# Patient Record
Sex: MALE | Race: WHITE | NOT HISPANIC OR LATINO | Employment: FULL TIME | URBAN - METROPOLITAN AREA
[De-identification: names, ages, dates, MRNs, and addresses within clinical notes are randomized per-mention and may not be internally consistent; named-entity substitution may affect disease eponyms.]

---

## 2023-05-03 LAB
LEAD, INDUSTRIAL, WHOLE BLOOD: 17.1 UG/DL
ZINC, PROTOPORPHYRIN  BLOOD: 27 UG/DL (ref 0–40)
ZINC, PROTOPORPHYRIN (ZPP) RATIO: 46 UMOLZPP/MOLHEM (ref 0–69)

## 2024-04-01 ENCOUNTER — OFFICE VISIT (OUTPATIENT)
Dept: PRIMARY CARE | Facility: CLINIC | Age: 39
End: 2024-04-01
Payer: COMMERCIAL

## 2024-04-01 VITALS
OXYGEN SATURATION: 98 % | SYSTOLIC BLOOD PRESSURE: 126 MMHG | BODY MASS INDEX: 23.37 KG/M2 | WEIGHT: 154.2 LBS | DIASTOLIC BLOOD PRESSURE: 78 MMHG | RESPIRATION RATE: 18 BRPM | TEMPERATURE: 97 F | HEART RATE: 88 BPM | HEIGHT: 68 IN

## 2024-04-01 DIAGNOSIS — K05.10 GINGIVITIS: ICD-10-CM

## 2024-04-01 DIAGNOSIS — Z00.00 HEALTHCARE MAINTENANCE: Primary | ICD-10-CM

## 2024-04-01 PROCEDURE — 99386 PREV VISIT NEW AGE 40-64: CPT | Performed by: FAMILY MEDICINE

## 2024-04-01 PROCEDURE — 90471 IMMUNIZATION ADMIN: CPT | Performed by: FAMILY MEDICINE

## 2024-04-01 PROCEDURE — 90715 TDAP VACCINE 7 YRS/> IM: CPT | Performed by: FAMILY MEDICINE

## 2024-04-01 RX ORDER — CHLORHEXIDINE GLUCONATE ORAL RINSE 1.2 MG/ML
15 SOLUTION DENTAL AS NEEDED
Qty: 120 ML | Refills: 0 | Status: SHIPPED | OUTPATIENT
Start: 2024-04-01 | End: 2024-04-15

## 2024-04-01 ASSESSMENT — ENCOUNTER SYMPTOMS
NECK STIFFNESS: 0
ANAL BLEEDING: 0
RECTAL PAIN: 0
WHEEZING: 0
ABDOMINAL PAIN: 0
CHEST TIGHTNESS: 0
EYE REDNESS: 0
DIFFICULTY URINATING: 0
HEADACHES: 0
NECK PAIN: 1
JOINT SWELLING: 0
PALPITATIONS: 0
HEMATURIA: 0
COUGH: 0
POLYPHAGIA: 0
DIARRHEA: 0
POLYDIPSIA: 0
TREMORS: 0
APNEA: 0
NAUSEA: 0
DIAPHORESIS: 0
SHORTNESS OF BREATH: 0
CHOKING: 0
BLOOD IN STOOL: 0
DIZZINESS: 0
CONSTIPATION: 0
DYSURIA: 0
CHILLS: 0
ARTHRALGIAS: 0
EYE ITCHING: 0
TROUBLE SWALLOWING: 0
EYE DISCHARGE: 0
BACK PAIN: 1
SINUS PRESSURE: 0
SORE THROAT: 0
ABDOMINAL DISTENTION: 0
SINUS PAIN: 0
NUMBNESS: 0
FLANK PAIN: 0
SEIZURES: 0
VOMITING: 0
UNEXPECTED WEIGHT CHANGE: 0
FATIGUE: 0
PHOTOPHOBIA: 0

## 2024-04-01 NOTE — ASSESSMENT & PLAN NOTE
Blood  pressure  is   stable Monitor your blood pressure at home and notify if blood pressure consistently over 140 systolic 90 diastolic   See dentist twice yearly  Get checked yearly  30 minutes of moderate intensity exercise 5 days weekly  Application of sunscreen every 2 hours during peak times  Regular sleep schedule  At least 8 hours nightly of sleep..  Limit TV/screen time 30 minutes prior to bedtime     CONSIDER   TAPER OFF WEED

## 2024-04-01 NOTE — PATIENT INSTRUCTIONS
Please consider the following medications to help    mitigate  Covid during this time  Vitamin C 1000 mg     daily  B complex daily  ZINC   30 - DAILY     VITAMIN D 3   200O IU DAILY  MENS   ENERGY  AND  METABOLISM   VITAMIN B12 2500 I U  SL          Multivitamin with zinc  Extra rest  Stress reduction  IN  SYMPTOMATIC  PATIENTS, MONITORING WITH  HOME PULSE OXIMETRY  IS RECOMMENDED..  AMBULATORY  DESATURATIONS BELOW  94%  SHOULD PROMPT HOSPITAL  ADMISSSION       Please consider exercise program involving walking or some other form of aerobic activity 5 days weekly for 30 minutes... Let's also consider strengthening of large muscle groups like the abdominal muscles or shoulder muscles... Twice weekly with reps of 5/10/15 exercises and gradually increase strength.. This is not heavy strength training but light weight training... Sit ups or back exercise routine.. Please ask for handout if uncertain how to do..This  will help to strengthen your muscles which in turn will help you to lose weight.... You might ask what is the best diet available.. I would strongly encourage you to consider  Weight Watchers.. And as  your  fellow on  Weight Watchers physician attempting to  live this  LIFE  style  choice with you....  I will be glad to give you recommendations on what to eat.. Consider buying Ailyn bread.., megan bagle thin bread.. oikos yogurt... eggs  to eat as hard-boiled... Halo top ice cream for snack... All these are delicious foods which.. when eaten and  being compliant eating three  meals daily  breakfast lunch and dinner, drinking  64 ounces of water daily we will all win together !!!!!!!. This will be a means for you to lose weight... Consider also the smart phone sheela ... My plate.. Or My  fitness  pal..,  as additional possibilities for weight loss... Toni Ponce!    Discussed medication side effects.  The  risk benefits and treatment options  discussed with patient.       Please schedule  follow-up appointment based upon your improvement/failure to improve/chronic medical conditions and physician recommendations during office appointment at the .       Patient advised to go to er if symptoms worsen or to call answering service, or to return to office for additional evaluation    This note was partially  generated using Dragon voice recognition and there may be incorrect words, wording, spelling, or pronunciation errors that were not corrected prior to committing the note to the medical record.

## 2024-04-01 NOTE — PROGRESS NOTES
"Subjective   Moise Meyer \"Deangelo\" is a 38 y.o. male who is here for a routine exam.  Active Problem List      Comprehensive Medical/Surgical/Social/Family History  Past Medical History:   Diagnosis Date    Broken ankle      Past Surgical History:   Procedure Laterality Date    VASECTOMY       Social History     Social History Narrative    Not on file         Allergies and Medications  Patient has no known allergies.  No current outpatient medications on file prior to visit.     No current facility-administered medications on file prior to visit.       Lifestyle  Diet: some fast food  ...    A lot of chicken / salad  Exercise: weights.. a lot of  walking    .. Up  and down  stairs .  Tobacco: see previous  Alcohol:  quit   Stress/Work:  foundry  ... Some dangerous component      Colorectal Screening:   Colonoscopy   not indicated      Nocturia: -  Erectile dysfunction: -    Review of Systems   Constitutional:  Negative for chills, diaphoresis, fatigue and unexpected weight change.   HENT:  Negative for congestion, dental problem, ear discharge, ear pain, hearing loss, mouth sores, nosebleeds, postnasal drip, sinus pressure, sinus pain, sneezing, sore throat and trouble swallowing.    Eyes:  Negative for photophobia, discharge, redness, itching and visual disturbance.   Respiratory:  Negative for apnea, cough, choking, chest tightness, shortness of breath and wheezing.    Cardiovascular:  Negative for chest pain, palpitations and leg swelling.   Gastrointestinal:  Negative for abdominal distention, abdominal pain, anal bleeding, blood in stool, constipation, diarrhea, nausea, rectal pain and vomiting.   Endocrine: Negative for cold intolerance, heat intolerance, polydipsia, polyphagia and polyuria.   Genitourinary:  Negative for difficulty urinating, dysuria, flank pain, genital sores, hematuria, scrotal swelling and testicular pain.   Musculoskeletal:  Positive for back pain and neck pain. Negative for " "arthralgias, joint swelling and neck stiffness.   Skin:  Negative for rash.   Neurological:  Negative for dizziness, tremors, seizures, numbness and headaches.       Objective   /78   Pulse 88   Temp 36.1 °C (97 °F)   Resp 18   Ht 1.727 m (5' 8\")   Wt 69.9 kg (154 lb 3.2 oz)   SpO2 98%   BMI 23.45 kg/m²     Physical Exam  Vitals reviewed.   Constitutional:       Appearance: Normal appearance.   HENT:      Head: Normocephalic.      Right Ear: External ear normal.      Left Ear: External ear normal.      Nose: Nose normal.      Mouth/Throat:      Mouth: Mucous membranes are moist.   Eyes:      Conjunctiva/sclera: Conjunctivae normal.   Cardiovascular:      Rate and Rhythm: Regular rhythm.      Heart sounds: Normal heart sounds.   Pulmonary:      Effort: Pulmonary effort is normal.      Breath sounds: Normal breath sounds.   Abdominal:      General: Bowel sounds are normal.      Palpations: Abdomen is soft.   Musculoskeletal:         General: Normal range of motion.      Cervical back: Neck supple.   Skin:     General: Skin is warm and dry.   Neurological:      General: No focal deficit present.      Mental Status: He is alert and oriented to person, place, and time.   Psychiatric:         Behavior: Behavior normal.         Judgment: Judgment normal.         Assessment/Plan   Problem List Items Addressed This Visit       Healthcare maintenance - Primary      Blood  pressure  is   stable Monitor your blood pressure at home and notify if blood pressure consistently over 140 systolic 90 diastolic   See dentist twice yearly  Get checked yearly  30 minutes of moderate intensity exercise 5 days weekly  Application of sunscreen every 2 hours during peak times  Regular sleep schedule  At least 8 hours nightly of sleep..  Limit TV/screen time 30 minutes prior to bedtime     CONSIDER   TAPER OFF WEED             Relevant Orders    CBC and Auto Differential    Comprehensive Metabolic Panel    Lipid Panel     Other " Visit Diagnoses       Gingivitis        Relevant Medications    chlorhexidine (Peridex) 0.12 % solution    Other Relevant Orders    CBC and Auto Differential    Lead, blood          Reviewed Social Determinants of health with patient, discussed healthy lifestyle including 150 minutes of physical activity per week  Ordered/Reviewed baseline labwork -CBC, CMP, Lipid Panel  Immunizations needs tetanus booster and given  ColonoscopyNOT  INDICATED

## 2024-05-01 ENCOUNTER — LAB REQUISITION (OUTPATIENT)
Dept: LAB | Facility: HOSPITAL | Age: 39
End: 2024-05-01
Payer: COMMERCIAL

## 2024-05-01 PROCEDURE — 83655 ASSAY OF LEAD: CPT

## 2024-05-06 LAB
LEAD BLDV-MCNC: 17.7 UG/DL
ZPP RBC-MCNC: 34 UG/DL (ref 0–40)
ZPP RBC-SRTO: 58 UMOLZPP/MOLHEM (ref 0–69)

## 2024-08-11 DIAGNOSIS — K05.10 GINGIVITIS: ICD-10-CM

## 2024-08-12 RX ORDER — DIAZEPAM 5 MG/1
TABLET ORAL
COMMUNITY
Start: 2024-07-14

## 2024-08-12 RX ORDER — CHLORHEXIDINE GLUCONATE ORAL RINSE 1.2 MG/ML
15 SOLUTION DENTAL AS NEEDED
Qty: 473 ML | Refills: 0 | Status: SHIPPED | OUTPATIENT
Start: 2024-08-12 | End: 2024-08-26

## 2024-08-12 RX ORDER — IBUPROFEN 800 MG/1
1 TABLET ORAL EVERY 8 HOURS PRN
COMMUNITY
Start: 2024-07-23

## 2025-04-07 ENCOUNTER — APPOINTMENT (OUTPATIENT)
Dept: PRIMARY CARE | Facility: CLINIC | Age: 40
End: 2025-04-07
Payer: COMMERCIAL